# Patient Record
Sex: FEMALE | Race: WHITE | NOT HISPANIC OR LATINO | URBAN - METROPOLITAN AREA
[De-identification: names, ages, dates, MRNs, and addresses within clinical notes are randomized per-mention and may not be internally consistent; named-entity substitution may affect disease eponyms.]

---

## 2023-09-01 PROBLEM — E66.812 CLASS 2 SEVERE OBESITY WITH SERIOUS COMORBIDITY AND BODY MASS INDEX (BMI) OF 37.0 TO 37.9 IN ADULT: Status: ACTIVE | Noted: 2023-09-01

## 2023-09-01 PROBLEM — B00.1 RECURRENT COLD SORES: Status: ACTIVE | Noted: 2023-09-01

## 2023-09-01 PROBLEM — R31.9 HEMATURIA: Status: ACTIVE | Noted: 2023-09-01

## 2023-09-01 PROBLEM — L30.9 HAND DERMATITIS: Status: ACTIVE | Noted: 2023-09-01

## 2023-09-01 PROBLEM — M21.41 ACQUIRED PES PLANUS OF BOTH FEET: Status: ACTIVE | Noted: 2023-09-01

## 2023-09-01 PROBLEM — R00.2 PALPITATIONS: Status: ACTIVE | Noted: 2023-09-01

## 2023-09-01 PROBLEM — M76.829 POSTERIOR TIBIAL TENDON DYSFUNCTION: Status: ACTIVE | Noted: 2023-09-01

## 2023-09-01 PROBLEM — R32 URINARY LEAKAGE: Status: ACTIVE | Noted: 2023-09-01

## 2023-09-01 PROBLEM — M21.42 ACQUIRED PES PLANUS OF BOTH FEET: Status: ACTIVE | Noted: 2023-09-01

## 2023-09-01 PROBLEM — T83.32XA IUD THREADS LOST: Status: ACTIVE | Noted: 2023-09-01

## 2023-09-01 PROBLEM — N76.0 VAGINITIS: Status: ACTIVE | Noted: 2023-09-01

## 2023-09-01 PROBLEM — M67.472 GANGLION CYST OF LEFT FOOT: Status: ACTIVE | Noted: 2023-09-01

## 2023-09-01 PROBLEM — E55.9 VITAMIN D DEFICIENCY: Status: ACTIVE | Noted: 2023-09-01

## 2023-09-01 PROBLEM — E66.01 CLASS 2 SEVERE OBESITY WITH SERIOUS COMORBIDITY AND BODY MASS INDEX (BMI) OF 37.0 TO 37.9 IN ADULT (MULTI): Status: ACTIVE | Noted: 2023-09-01

## 2023-09-01 RX ORDER — ASPIRIN 325 MG
50000 TABLET, DELAYED RELEASE (ENTERIC COATED) ORAL
COMMUNITY
Start: 2020-12-29

## 2023-09-01 RX ORDER — VALACYCLOVIR HYDROCHLORIDE 1 G/1
2000 TABLET, FILM COATED ORAL 2 TIMES DAILY
COMMUNITY
Start: 2021-01-05 | End: 2023-11-03

## 2023-09-01 RX ORDER — TRAZODONE HYDROCHLORIDE 50 MG/1
50 TABLET ORAL NIGHTLY PRN
COMMUNITY
End: 2023-10-26

## 2023-09-01 RX ORDER — CHOLECALCIFEROL (VITAMIN D3) 50 MCG
50 TABLET ORAL DAILY
COMMUNITY
End: 2023-10-26

## 2023-10-09 ENCOUNTER — APPOINTMENT (OUTPATIENT)
Dept: OBSTETRICS AND GYNECOLOGY | Facility: CLINIC | Age: 43
End: 2023-10-09
Payer: COMMERCIAL

## 2023-10-26 ENCOUNTER — OFFICE VISIT (OUTPATIENT)
Dept: OBSTETRICS AND GYNECOLOGY | Facility: CLINIC | Age: 43
End: 2023-10-26
Payer: COMMERCIAL

## 2023-10-26 VITALS
HEIGHT: 66 IN | SYSTOLIC BLOOD PRESSURE: 130 MMHG | BODY MASS INDEX: 36.8 KG/M2 | DIASTOLIC BLOOD PRESSURE: 80 MMHG | WEIGHT: 229 LBS

## 2023-10-26 DIAGNOSIS — Z12.11 SCREEN FOR COLON CANCER: ICD-10-CM

## 2023-10-26 DIAGNOSIS — Z01.419 ENCOUNTER FOR GYNECOLOGICAL EXAMINATION WITHOUT ABNORMAL FINDING: ICD-10-CM

## 2023-10-26 DIAGNOSIS — Z12.31 VISIT FOR SCREENING MAMMOGRAM: Primary | ICD-10-CM

## 2023-10-26 PROCEDURE — 99386 PREV VISIT NEW AGE 40-64: CPT | Performed by: OBSTETRICS & GYNECOLOGY

## 2023-10-26 PROCEDURE — 87624 HPV HI-RISK TYP POOLED RSLT: CPT

## 2023-10-26 PROCEDURE — 88175 CYTOPATH C/V AUTO FLUID REDO: CPT

## 2023-10-26 PROCEDURE — 1036F TOBACCO NON-USER: CPT | Performed by: OBSTETRICS & GYNECOLOGY

## 2023-10-26 NOTE — PROGRESS NOTES
Subjective   Belinda Tom is a 43 y.o. female here for a routine exam. Current complaints: She feels well.  She has amenorrhea with her Mirena IUD that was inserted January 20, 2017.  She has no pelvic pain, no dysuria, no change in bowel habits or vaginal discharge.    We discussed occasional stress incontinence and Kegel exercises.  She is , no change in partner.  She is a teacher.. Personal health questionnaire reviewed: yes.     Gynecologic History  No LMP recorded (lmp unknown). Patient has had an implant.  Contraception: IUD  Last Pap: 3/2018. Results were: normal  Last mammogram: 1/17/22. Results were: normal    Obstetric History  OB History   No obstetric history on file.       Objective   Constitutional: Alert and in no acute distress. Well developed, well nourished.   Head and Face: Head and face: Normal.    Eyes: Normal external exam - nonicteric sclera, extraocular movements intact (EOMI) and no ptosis.   Neck: No neck asymmetry. Supple. Thyroid not enlarged and there were no palpable thyroid nodules.    Pulmonary: No respiratory distress.   Chest: Breasts: Normal appearance, no nipple discharge and no skin changes. Palpation of breasts and axillae: No palpable mass and no axillary lymphadenopathy.   Abdomen: Soft nontender; no abdominal mass palpated. No organomegaly. No hernias.   Genitourinary: External genitalia: Normal. No inguinal lymphadenopathy. Bartholin's Urethral and Skenes Glands: Normal. Urethra: Normal.  Bladder: Normal on palpation. Vagina: Normal. Cervix: Normal. A pap was sent.  The strings are visible of the Mirena IUD.  Uterus: Normal.  Right Adnexa/parametria: Normal.  Left Adnexa/parametria: Normal.  Inspection of Perianal Area: Normal.   Musculoskeletal: No joint swelling seen, normal movements of all extremities.   Skin: Normal skin color and pigmentation, normal skin turgor, and no rash.   Neurologic: Non-focal. Grossly intact.   Psychiatric: Alert and oriented x 3.  Affect normal to patient baseline. Mood: Appropriate.  Physical Exam     Assessment/Plan   Healthy female exam.  This is a 43-year-old female with a normal exam.  A Pap smear was sent, I will contact her if there are any abnormalities.  Her routine mammogram was ordered with tomosynthesis.  The strings of the Mirena IUD are visible, consistent with an IUD in the proper location.  It was inserted 2017, and will  and 2025.  I will see her in 1 year.  Mammogram ordered.

## 2023-11-03 ENCOUNTER — OFFICE VISIT (OUTPATIENT)
Dept: PRIMARY CARE | Facility: CLINIC | Age: 43
End: 2023-11-03
Payer: COMMERCIAL

## 2023-11-03 VITALS
SYSTOLIC BLOOD PRESSURE: 110 MMHG | DIASTOLIC BLOOD PRESSURE: 70 MMHG | BODY MASS INDEX: 36.48 KG/M2 | HEIGHT: 66 IN | WEIGHT: 227 LBS | HEART RATE: 68 BPM | OXYGEN SATURATION: 96 %

## 2023-11-03 DIAGNOSIS — R00.2 PALPITATIONS: ICD-10-CM

## 2023-11-03 DIAGNOSIS — Z00.00 ROUTINE GENERAL MEDICAL EXAMINATION AT A HEALTH CARE FACILITY: Primary | ICD-10-CM

## 2023-11-03 PROCEDURE — 1036F TOBACCO NON-USER: CPT | Performed by: STUDENT IN AN ORGANIZED HEALTH CARE EDUCATION/TRAINING PROGRAM

## 2023-11-03 PROCEDURE — 99396 PREV VISIT EST AGE 40-64: CPT | Performed by: STUDENT IN AN ORGANIZED HEALTH CARE EDUCATION/TRAINING PROGRAM

## 2023-11-03 RX ORDER — VALACYCLOVIR HYDROCHLORIDE 1 G/1
1000 TABLET, FILM COATED ORAL 2 TIMES DAILY
COMMUNITY

## 2023-11-03 NOTE — PROGRESS NOTES
"  Subjective     Patient ID: Belinda Tom is a 43 y.o. female who presents for Annual Exam (CPE.).      \"Feeling  off \" X 10 days - HA , nausea , palpitations     Last Physical : ___Years ago     Pt's PMH, PSH, SH, FH , meds and allergies was obtained / reviewed and updated .     Dental visits : Y  Vision issues : N  Hearing issues : N    Immunizations : Y    Diet :  could be better  Exercise:  Weight concerns :     Alcohol: as noted in   Tobacco: as noted in   Recreational drug use : None/ as noted in       ======================================================    Visit Vitals  /70   Pulse 68   Ht 1.676 m (5' 6\")   Wt 103 kg (227 lb)   LMP  (LMP Unknown) Comment: Mirena IUD   SpO2 96%   BMI 36.64 kg/m²   OB Status Implant   Smoking Status Never   BSA 2.19 m²      No LMP recorded (lmp unknown). Patient has had an implant.     =====================================    Review of systems:  Constitutional: no chills, no fever and no night sweats.     Eyes: no blurred vision and no eyesight problems.     ENT: no hearing loss, no nasal congestion, no nasal discharge, no hoarseness and no sore throat.     Cardiovascular: no chest pain, no intermittent leg claudication, no lower extremity edema, no palpitations and no syncope.     Respiratory: no cough, no shortness of breath during exertion, no shortness of breath at rest and no wheezing.     Gastrointestinal: no abdominal pain, no blood in stools, no constipation, no diarrhea, no melena, no nausea, no rectal pain and no vomiting.     Genitourinary: no dysuria, no change in urinary frequency, no urinary hesitancy, no feelings of urinary urgency and no vaginal discharge.     Musculoskeletal: no arthralgias, no back pain and no myalgias.     Integumentary: no new skin lesions and no rashes.     Neurological: no difficulty walking, no headache, no limb weakness, no numbness and no tingling.     Psychiatric: no anxiety, no depression, no anhedonia and no substance " "use disorders.   ============================================================    Physical exam :    Constitutional: Alert and in no acute distress. Well developed, well nourished.     Eyes: Normal external exam. Pupils were equal in size, round, reactive to light (PERRL) with normal accommodation and extraocular movements intact (EOMI).     Ears, Nose, Mouth, and Throat: External inspection of ears and nose: Normal.  Otoscopic examination: Normal.      Neck: No neck mass was observed. Supple.     Cardiovascular: Heart rate and rhythm were normal, normal S1 and S2, no gallops, no murmurs and no pericardial rub    Pulmonary: No respiratory distress. Clear bilateral breath sounds.     Abdomen: Soft nontender; no abdominal mass palpated. No organomegaly.     Musculoskeletal: No joint swelling seen, normal movements of all extremities. Range of motion: Normal.  Muscle strength/tone: Normal.      Neurologic: Deep tendon reflexes were 2+ and symmetric. Sensation: Normal.     Psychiatric: Judgment and insight: Intact. Mood and affect: Normal.        Assessment/Plan    Diagnoses and all orders for this visit:  Routine general medical examination at a health care facility  -     CBC; Future  -     Comprehensive Metabolic Panel; Future  -     Hemoglobin A1C; Future  -     Lipid Panel; Future  -     TSH with reflex to Free T4 if abnormal; Future  Palpitations  -     Referral to Cardiology; Future              43year-old female with obesity presents for annual physical exam     Rpt BP as noted in vitals   To RTO  in 2m  with home readings     Unclear reasons at this time abt her \" generalized unwell feeling \"   No arrhythmia noted on exam , normal BP   Will wait on the labs     Fu with cardiology for intermittent palpitations   Had zio patch placed a year ago      HM :   Tdap : Up-to-date  Flu : Up-to-date     Mammogram : Ordered by gyn to schedule    PAP : done 10/2023, wali with gyn   Cologard ordered , results pending "     General preventive care discussed which includes regular exercise, healthy eating habits, to include more of plant based diet, to include foods of all colors  , limiting excessive red meat intake , staying active to maintain a weight that is appropriate for his/ her height / making efforts to reach an ideal weight for height,  avoidance of smoking, excess alcohol consumption, avoidance of recreational drugs, safe and responsible sexual relationships and practices.     Calcium + Vit D supplements recommended   Regular exercise recommended   Healthy eating choices discussed and recommended   BMI ( Body mass index ) discussed and encouraged weight loss through the above discussed lifestyle modifications .     Conditions addressed and mgmt as noted above.  Pertinent labs, images/ imaging reports , chart review was done .   Age appropriate labs / labs for mgmt of chronic medical conditions ordered, further mgmt pending the results.

## 2023-11-07 ENCOUNTER — LAB (OUTPATIENT)
Dept: LAB | Facility: LAB | Age: 43
End: 2023-11-07
Payer: COMMERCIAL

## 2023-11-07 DIAGNOSIS — Z00.00 ROUTINE GENERAL MEDICAL EXAMINATION AT A HEALTH CARE FACILITY: ICD-10-CM

## 2023-11-07 LAB
ALBUMIN SERPL BCP-MCNC: 4.6 G/DL (ref 3.4–5)
ALP SERPL-CCNC: 68 U/L (ref 33–110)
ALT SERPL W P-5'-P-CCNC: 30 U/L (ref 7–45)
ANION GAP SERPL CALC-SCNC: 14 MMOL/L (ref 10–20)
AST SERPL W P-5'-P-CCNC: 20 U/L (ref 9–39)
BILIRUB SERPL-MCNC: 0.7 MG/DL (ref 0–1.2)
BUN SERPL-MCNC: 14 MG/DL (ref 6–23)
CALCIUM SERPL-MCNC: 9.3 MG/DL (ref 8.6–10.6)
CHLORIDE SERPL-SCNC: 104 MMOL/L (ref 98–107)
CHOLEST SERPL-MCNC: 185 MG/DL (ref 0–199)
CHOLESTEROL/HDL RATIO: 5
CO2 SERPL-SCNC: 26 MMOL/L (ref 21–32)
CREAT SERPL-MCNC: 0.68 MG/DL (ref 0.5–1.05)
ERYTHROCYTE [DISTWIDTH] IN BLOOD BY AUTOMATED COUNT: 11.9 % (ref 11.5–14.5)
EST. AVERAGE GLUCOSE BLD GHB EST-MCNC: 108 MG/DL
GFR SERPL CREATININE-BSD FRML MDRD: >90 ML/MIN/1.73M*2
GLUCOSE SERPL-MCNC: 81 MG/DL (ref 74–99)
HBA1C MFR BLD: 5.4 %
HCT VFR BLD AUTO: 43 % (ref 36–46)
HDLC SERPL-MCNC: 37.3 MG/DL
HGB BLD-MCNC: 14.3 G/DL (ref 12–16)
LDLC SERPL CALC-MCNC: 117 MG/DL
MCH RBC QN AUTO: 30.2 PG (ref 26–34)
MCHC RBC AUTO-ENTMCNC: 33.3 G/DL (ref 32–36)
MCV RBC AUTO: 91 FL (ref 80–100)
NON HDL CHOLESTEROL: 148 MG/DL (ref 0–149)
NRBC BLD-RTO: 0 /100 WBCS (ref 0–0)
PLATELET # BLD AUTO: 258 X10*3/UL (ref 150–450)
POTASSIUM SERPL-SCNC: 4.3 MMOL/L (ref 3.5–5.3)
PROT SERPL-MCNC: 7.2 G/DL (ref 6.4–8.2)
RBC # BLD AUTO: 4.74 X10*6/UL (ref 4–5.2)
SODIUM SERPL-SCNC: 140 MMOL/L (ref 136–145)
TRIGL SERPL-MCNC: 154 MG/DL (ref 0–149)
TSH SERPL-ACNC: 1.87 MIU/L (ref 0.44–3.98)
VLDL: 31 MG/DL (ref 0–40)
WBC # BLD AUTO: 6.1 X10*3/UL (ref 4.4–11.3)

## 2023-11-07 PROCEDURE — 80061 LIPID PANEL: CPT

## 2023-11-07 PROCEDURE — 83036 HEMOGLOBIN GLYCOSYLATED A1C: CPT

## 2023-11-07 PROCEDURE — 85027 COMPLETE CBC AUTOMATED: CPT

## 2023-11-07 PROCEDURE — 36415 COLL VENOUS BLD VENIPUNCTURE: CPT

## 2023-11-07 PROCEDURE — 84443 ASSAY THYROID STIM HORMONE: CPT

## 2023-11-07 PROCEDURE — 80053 COMPREHEN METABOLIC PANEL: CPT

## 2023-11-09 LAB
CYTOLOGY CMNT CVX/VAG CYTO-IMP: NORMAL
HPV HR GENOTYPES PNL CVX NAA+PROBE: NEGATIVE
HPV HR GENOTYPES PNL CVX NAA+PROBE: NEGATIVE
HPV16 DNA SPEC QL NAA+PROBE: NEGATIVE
HPV18 DNA SPEC QL NAA+PROBE: NEGATIVE
LAB AP CONTRACEPTIVE HISTORY: NORMAL
LAB AP HPV GENOTYPE QUESTION: YES
LAB AP HPV HR: NORMAL
LABORATORY COMMENT REPORT: NORMAL
NONINV COLON CA DNA+OCC BLD SCRN STL QL: NEGATIVE
PATH REPORT.TOTAL CANCER: NORMAL

## 2023-11-22 ENCOUNTER — ANCILLARY PROCEDURE (OUTPATIENT)
Dept: RADIOLOGY | Facility: CLINIC | Age: 43
End: 2023-11-22
Payer: COMMERCIAL

## 2023-11-22 DIAGNOSIS — Z12.31 VISIT FOR SCREENING MAMMOGRAM: ICD-10-CM

## 2023-11-22 PROCEDURE — 77067 SCR MAMMO BI INCL CAD: CPT

## 2023-11-22 PROCEDURE — 77067 SCR MAMMO BI INCL CAD: CPT | Performed by: RADIOLOGY

## 2023-11-22 PROCEDURE — 77063 BREAST TOMOSYNTHESIS BI: CPT | Performed by: RADIOLOGY

## 2023-12-22 ENCOUNTER — OFFICE VISIT (OUTPATIENT)
Dept: PRIMARY CARE | Facility: CLINIC | Age: 43
End: 2023-12-22
Payer: COMMERCIAL

## 2023-12-22 VITALS
SYSTOLIC BLOOD PRESSURE: 130 MMHG | OXYGEN SATURATION: 96 % | HEIGHT: 66 IN | DIASTOLIC BLOOD PRESSURE: 87 MMHG | HEART RATE: 73 BPM | WEIGHT: 230.6 LBS | BODY MASS INDEX: 37.06 KG/M2

## 2023-12-22 DIAGNOSIS — R51.9 NONINTRACTABLE EPISODIC HEADACHE, UNSPECIFIED HEADACHE TYPE: Primary | ICD-10-CM

## 2023-12-22 PROCEDURE — 1036F TOBACCO NON-USER: CPT | Performed by: STUDENT IN AN ORGANIZED HEALTH CARE EDUCATION/TRAINING PROGRAM

## 2023-12-22 PROCEDURE — 99214 OFFICE O/P EST MOD 30 MIN: CPT | Performed by: STUDENT IN AN ORGANIZED HEALTH CARE EDUCATION/TRAINING PROGRAM

## 2023-12-22 RX ORDER — NAPROXEN 500 MG/1
500 TABLET ORAL 2 TIMES DAILY PRN
Qty: 60 TABLET | Refills: 3 | Status: SHIPPED | OUTPATIENT
Start: 2023-12-22 | End: 2024-03-21

## 2023-12-22 RX ORDER — RIZATRIPTAN BENZOATE 5 MG/1
5 TABLET ORAL ONCE AS NEEDED
Qty: 9 TABLET | Refills: 3 | Status: SHIPPED | OUTPATIENT
Start: 2023-12-22 | End: 2024-01-21

## 2023-12-22 NOTE — PROGRESS NOTES
"Subjective   Patient ID: Belinda Tom is a 43 y.o. female who presents for Follow-up (Follow-up from last visit. ).        HPI    HA 3-4 times a year, not always associated with nausea   Frontal and temporal   Migraine HA- mother and daughter       Visit Vitals  /87   Pulse 73   Ht 1.676 m (5' 6\")   Wt 105 kg (230 lb 9.6 oz)   SpO2 96%   BMI 37.22 kg/m²   OB Status Implant   Smoking Status Never   BSA 2.21 m²      No LMP recorded. Patient has had an implant.     Review of Systems    Constitutional : No feeling poorly / fevers/ chills / night sweats/ fatigue   Cardiovascular : No CP /Palpitations/ lower extremity edema / syncope   Respiratory : No Cough /CONTRERAS/Dyspnea at rest     CNS: No confusion / HA/ tingling/ numbness/ weakness of extremities  Psychiatric: No anxiety/ depression/ SI/HI    All other systems have been reviewed and are negative for complaint       Physical Exam    Constitutional : Vitals reviewed. Alert and in no distress  Cardiovascular : RRR, Normal S1, S2, No pericardial rub/ gallop, no peripheral edema   Pulmonary: No respiratory distress, CTAB   MSK : Normal gait and station , strength and tone     Neurologic : CNs 2-12 grossly intact , no obvious FNDs  Psych : A,Ox3, normal mood and affect      Assessment/Plan   Diagnoses and all orders for this visit:  Nonintractable episodic headache, unspecified headache type  -     rizatriptan (Maxalt) 5 mg tablet; Take 1 tablet (5 mg) by mouth 1 time if needed for migraine. May repeat in 2 hours if unresolved. Do not exceed 30 mg in 24 hours.  -     naproxen (Naprosyn) 500 mg tablet; Take 1 tablet (500 mg) by mouth 2 times a day as needed for headaches (pain).        HA . Though does not have many of the typical features of migraine HA , given the family history , will rx with triptans     Conditions addressed and mgmt as noted above.  Pertinent labs, images/ imaging reports , chart review was done .   Age appropriate labs / labs for mgmt of chronic " medical conditions ordered, further mgmt pending the results.

## 2024-01-16 ENCOUNTER — APPOINTMENT (OUTPATIENT)
Dept: PRIMARY CARE | Facility: CLINIC | Age: 44
End: 2024-01-16
Payer: COMMERCIAL

## 2024-09-09 DIAGNOSIS — B00.1 RECURRENT COLD SORES: Primary | ICD-10-CM

## 2024-09-12 RX ORDER — VALACYCLOVIR HYDROCHLORIDE 1 G/1
1000 TABLET, FILM COATED ORAL 2 TIMES DAILY
Qty: 60 TABLET | Refills: 0 | Status: SHIPPED | OUTPATIENT
Start: 2024-09-12

## 2024-11-01 ENCOUNTER — APPOINTMENT (OUTPATIENT)
Dept: OBSTETRICS AND GYNECOLOGY | Facility: CLINIC | Age: 44
End: 2024-11-01
Payer: COMMERCIAL

## 2024-11-01 VITALS
HEIGHT: 66 IN | BODY MASS INDEX: 36.32 KG/M2 | HEART RATE: 77 BPM | SYSTOLIC BLOOD PRESSURE: 118 MMHG | DIASTOLIC BLOOD PRESSURE: 81 MMHG | WEIGHT: 226 LBS

## 2024-11-01 DIAGNOSIS — Z01.419 ENCOUNTER FOR GYNECOLOGICAL EXAMINATION WITHOUT ABNORMAL FINDING: Primary | ICD-10-CM

## 2024-11-01 PROCEDURE — 3008F BODY MASS INDEX DOCD: CPT | Performed by: OBSTETRICS & GYNECOLOGY

## 2024-11-01 PROCEDURE — 99396 PREV VISIT EST AGE 40-64: CPT | Performed by: OBSTETRICS & GYNECOLOGY

## 2024-11-01 PROCEDURE — 1036F TOBACCO NON-USER: CPT | Performed by: OBSTETRICS & GYNECOLOGY

## 2024-11-01 RX ORDER — LEVONORGESTREL 52 MG/1
1 INTRAUTERINE DEVICE INTRAUTERINE ONCE
COMMUNITY

## 2024-11-27 ENCOUNTER — HOSPITAL ENCOUNTER (OUTPATIENT)
Dept: RADIOLOGY | Facility: CLINIC | Age: 44
Discharge: HOME | End: 2024-11-27
Payer: COMMERCIAL

## 2024-11-27 VITALS — HEIGHT: 66 IN | BODY MASS INDEX: 35.36 KG/M2 | WEIGHT: 220 LBS

## 2024-11-27 DIAGNOSIS — Z12.31 ENCOUNTER FOR SCREENING MAMMOGRAM FOR MALIGNANT NEOPLASM OF BREAST: ICD-10-CM

## 2024-11-27 DIAGNOSIS — Z01.419 ENCOUNTER FOR GYNECOLOGICAL EXAMINATION WITHOUT ABNORMAL FINDING: ICD-10-CM

## 2024-11-27 PROCEDURE — 77067 SCR MAMMO BI INCL CAD: CPT

## 2025-01-27 ENCOUNTER — APPOINTMENT (OUTPATIENT)
Dept: PRIMARY CARE | Facility: CLINIC | Age: 45
End: 2025-01-27
Payer: COMMERCIAL

## 2025-01-27 VITALS
WEIGHT: 237.6 LBS | BODY MASS INDEX: 38.18 KG/M2 | HEIGHT: 66 IN | DIASTOLIC BLOOD PRESSURE: 80 MMHG | OXYGEN SATURATION: 97 % | SYSTOLIC BLOOD PRESSURE: 120 MMHG | HEART RATE: 88 BPM

## 2025-01-27 DIAGNOSIS — M77.11 LATERAL EPICONDYLITIS OF RIGHT ELBOW: ICD-10-CM

## 2025-01-27 DIAGNOSIS — Z00.00 ROUTINE GENERAL MEDICAL EXAMINATION AT A HEALTH CARE FACILITY: Primary | ICD-10-CM

## 2025-01-27 DIAGNOSIS — M79.672 FOOT PAIN, BILATERAL: ICD-10-CM

## 2025-01-27 DIAGNOSIS — M79.671 FOOT PAIN, BILATERAL: ICD-10-CM

## 2025-01-27 PROCEDURE — 99214 OFFICE O/P EST MOD 30 MIN: CPT | Performed by: STUDENT IN AN ORGANIZED HEALTH CARE EDUCATION/TRAINING PROGRAM

## 2025-01-27 PROCEDURE — 1036F TOBACCO NON-USER: CPT | Performed by: STUDENT IN AN ORGANIZED HEALTH CARE EDUCATION/TRAINING PROGRAM

## 2025-01-27 PROCEDURE — 3008F BODY MASS INDEX DOCD: CPT | Performed by: STUDENT IN AN ORGANIZED HEALTH CARE EDUCATION/TRAINING PROGRAM

## 2025-01-27 PROCEDURE — 99396 PREV VISIT EST AGE 40-64: CPT | Performed by: STUDENT IN AN ORGANIZED HEALTH CARE EDUCATION/TRAINING PROGRAM

## 2025-01-27 RX ORDER — ACETAMINOPHEN 500 MG
2000 TABLET ORAL DAILY
COMMUNITY

## 2025-01-27 NOTE — PROGRESS NOTES
"  Subjective     Patient ID: Belinda Tom is a 44 y.o. female who presents for Annual Exam (APE. ).      Last Physical : ___Years ago     Pt's PMH, PSH, SH, FH , meds and allergies was obtained / reviewed and updated .     Dental visits : Y  Vision issues ::   Hearing issues : N    Immunizations : Y    Diet :     Exercise:  Weight concerns :     Alcohol: as noted in   Tobacco: as noted in   Recreational drug use : None/ as noted in     ======================================================    Visit Vitals  /88   Pulse 88   Ht 1.676 m (5' 6\")   Wt 108 kg (237 lb 9.6 oz)   SpO2 97%   BMI 38.35 kg/m²   OB Status Implant   Smoking Status Never   BSA 2.24 m²      No LMP recorded. Patient has had an implant.   Current Outpatient Medications   Medication Instructions    cetirizine HCl (ZYRTEC ORAL) Take by mouth.    cholecalciferol (VITAMIN D-3) 50,000 Units, Once Weekly    levonorgestrel (Mirena) 20 mcg/24hr IUD 1 each, Once    rizatriptan (MAXALT) 5 mg, oral, Once as needed, May repeat in 2 hours if unresolved. Do not exceed 30 mg in 24 hours.    valACYclovir (VALTREX) 1,000 mg, oral, 2 times daily      Social History     Tobacco Use    Smoking status: Never    Smokeless tobacco: Never   Substance Use Topics    Alcohol use: Never        =====================================    Review of systems:  Constitutional: no chills, no fever and no night sweats.     Eyes: no blurred vision and no eyesight problems.     ENT: no hearing loss, no nasal congestion, no nasal discharge, no hoarseness and no sore throat.     Cardiovascular: no chest pain, no intermittent leg claudication, no lower extremity edema, no palpitations and no syncope.     Respiratory: no cough, no shortness of breath during exertion, no shortness of breath at rest and no wheezing.     Gastrointestinal: no abdominal pain, no blood in stools, no constipation, no diarrhea, no melena, no nausea, no rectal pain and no vomiting.     Genitourinary: no " "dysuria, no change in urinary frequency, no urinary hesitancy, no feelings of urinary urgency and no vaginal discharge.     Musculoskeletal: no arthralgias, no back pain and no myalgias.     Integumentary: no new skin lesions and no rashes.     Neurological: no difficulty walking, no headache, no limb weakness, no numbness and no tingling.     Psychiatric: no anxiety, no depression, no anhedonia and no substance use disorders.   ============================================================    Physical exam :    Constitutional: Alert and in no acute distress. Well developed, well nourished.     Eyes: Normal external exam. Pupils were equal in size, round, reactive to light (PERRL) with normal accommodation and extraocular movements intact (EOMI).     Ears, Nose, Mouth, and Throat: External inspection of ears and nose: Normal.  Otoscopic examination: Normal.      Neck: No neck mass was observed. Supple.     Cardiovascular: Heart rate and rhythm were normal, normal S1 and S2, no gallops, no murmurs and no pericardial rub    Pulmonary: No respiratory distress. Clear bilateral breath sounds.     Abdomen: Soft nontender; no abdominal mass palpated. No organomegaly.     Musculoskeletal: No joint swelling seen, normal movements of all extremities. Range of motion: Normal.  Muscle strength/tone: Normal.      Neurologic: Deep tendon reflexes were 2+ and symmetric. Sensation: Normal.     Psychiatric: Judgment and insight: Intact. Mood and affect: Normal.        Assessment/Plan    Diagnoses and all orders for this visit:  Routine general medical examination at a health care facility  -     CBC; Future  -     Comprehensive Metabolic Panel; Future  -     Hemoglobin A1C; Future  -     Lipid Panel; Future  -     TSH with reflex to Free T4 if abnormal; Future        43year-old female with obesity presents for annual physical exam      \" Dislocated ribs\" chiropractor realigns them  Ortho and podiatry referrals as above   Rpt BP at " goal         HM :   Tdap : Up-to-date  Flu : Up-to-date  Shingles : at age 48      Mammogram :  utd   PAP : est with gyn   Cologard negative negative in 2026     General preventive care discussed which includes regular exercise, healthy eating habits, to include more of plant based diet, to include foods of all colors  , limiting excessive red meat intake , staying active to maintain a weight that is appropriate for his/ her height / making efforts to reach an ideal weight for height,  avoidance of smoking, excess alcohol consumption, avoidance of recreational drugs, safe and responsible sexual relationships and practices.     It is recommended to get 150 mins of  moderate intensity  ( you should be able to talk and not sing ) exercise in a week .     Calcium + Vit D supplements recommended   Regular exercise recommended   Healthy eating choices discussed and recommended   BMI ( Body mass index ) discussed and encouraged weight loss through the above discussed lifestyle modifications .     Conditions addressed and mgmt as noted above.  Pertinent labs, images/ imaging reports , chart review was done .   Age appropriate labs / labs for mgmt of chronic medical conditions ordered, further mgmt pending the results.       This note is intended for the physician writing it, as well as to communicate findings to other healthcare professionals. These notes use medical lexicon that may be misunderstood by non medical persons. Therefore, interpretations of medical notes and terminology should be approached with caution.

## 2025-01-28 DIAGNOSIS — L25.9 CONTACT DERMATITIS, UNSPECIFIED CONTACT DERMATITIS TYPE, UNSPECIFIED TRIGGER: Primary | ICD-10-CM

## 2025-01-28 RX ORDER — CLOBETASOL PROPIONATE 0.5 MG/G
CREAM TOPICAL
Qty: 15 G | Refills: 2 | Status: SHIPPED | OUTPATIENT
Start: 2025-01-28

## 2025-01-28 RX ORDER — CLOBETASOL PROPIONATE 0.5 MG/G
CREAM TOPICAL 2 TIMES DAILY
COMMUNITY
End: 2025-01-28 | Stop reason: SDUPTHER

## 2025-01-30 ENCOUNTER — APPOINTMENT (OUTPATIENT)
Facility: CLINIC | Age: 45
End: 2025-01-30
Payer: COMMERCIAL

## 2025-01-30 VITALS
SYSTOLIC BLOOD PRESSURE: 141 MMHG | BODY MASS INDEX: 38.25 KG/M2 | WEIGHT: 237 LBS | DIASTOLIC BLOOD PRESSURE: 86 MMHG | HEART RATE: 97 BPM

## 2025-01-30 DIAGNOSIS — Z30.433 ENCOUNTER FOR REMOVAL AND REINSERTION OF IUD: Primary | ICD-10-CM

## 2025-01-30 PROCEDURE — 58300 INSERT INTRAUTERINE DEVICE: CPT | Performed by: OBSTETRICS & GYNECOLOGY

## 2025-01-30 PROCEDURE — 58301 REMOVE INTRAUTERINE DEVICE: CPT | Performed by: OBSTETRICS & GYNECOLOGY

## 2025-01-30 NOTE — PROGRESS NOTES
Patient ID: Belinda Tom is a 44 y.o. female here for removal of  Mirena IUD with reinsertion.  The new Mirena will  in 2033.    IUD Removal    Performed by: Yeni Obrien MD  Authorized by: Yeni Obrien MD    Procedure: IUD removal and insertion    Consent obtained by patient, parent, or legal power of  - including discussion of procedure risks and benefits, patient questions answered, and patient education provided: yes    Reason for removal: patient request    Strings visualized: yes    Cervix cleaned with: iodopovidone    Tenaculum applied to cervix: no    IUD grasped by forceps: yes    IUD removed: yes    Date/Time of Removal:  2025 3:45 PM  Removed without complications: yes    IUD intact: yes    Date/Time of Insertion:  2025 3:46 PM  Immediately prior to procedure a time out was called: yes    Pelvic exam performed: yes    Speculum placed in vagina: yes    Cervix cleaned and prepped: yes    Tenaculum/Allis/Ring Forceps applied to cervix: yes    Anesthesia used: yes    Local anesthesia:  Topical  Local anesthetic: HurriCaine spray was used.  Cervix manually dilated: no    OSM: levonorgestrel 20 mcg/24hr  Patient tolerated procedure well: yes    Estimated blood loss (mL):  0  Intended removal date: 8 years    Insertion comments: This is a 44-year-old female with an  Mirena IUD, it was inserted 2017.  She desires removal of  IUD and reinsertion with a new Mirena.  The new Mirena will  in 2033.  The removal and insertion was uncomplicated.     PAST SURGICAL HISTORY:  H/O knee surgery BILATERAL SCOPE    Status post left rotator cuff repair

## 2025-02-04 LAB
ALBUMIN SERPL-MCNC: 4.6 G/DL (ref 3.6–5.1)
ALP SERPL-CCNC: 75 U/L (ref 31–125)
ALT SERPL-CCNC: 32 U/L (ref 6–29)
ANION GAP SERPL CALCULATED.4IONS-SCNC: 9 MMOL/L (CALC) (ref 7–17)
AST SERPL-CCNC: 22 U/L (ref 10–35)
BILIRUB SERPL-MCNC: 0.7 MG/DL (ref 0.2–1.2)
BUN SERPL-MCNC: 12 MG/DL (ref 7–25)
CALCIUM SERPL-MCNC: 9.2 MG/DL (ref 8.6–10.2)
CHLORIDE SERPL-SCNC: 104 MMOL/L (ref 98–110)
CHOLEST SERPL-MCNC: 207 MG/DL
CHOLEST/HDLC SERPL: 4.5 (CALC)
CO2 SERPL-SCNC: 25 MMOL/L (ref 20–32)
CREAT SERPL-MCNC: 0.6 MG/DL (ref 0.5–0.99)
EGFRCR SERPLBLD CKD-EPI 2021: 112 ML/MIN/1.73M2
ERYTHROCYTE [DISTWIDTH] IN BLOOD BY AUTOMATED COUNT: 12.1 % (ref 11–15)
EST. AVERAGE GLUCOSE BLD GHB EST-MCNC: 120 MG/DL
EST. AVERAGE GLUCOSE BLD GHB EST-SCNC: 6.6 MMOL/L
GLUCOSE SERPL-MCNC: 92 MG/DL (ref 65–99)
HBA1C MFR BLD: 5.8 % OF TOTAL HGB
HCT VFR BLD AUTO: 42.8 % (ref 35–45)
HDLC SERPL-MCNC: 46 MG/DL
HGB BLD-MCNC: 14.3 G/DL (ref 11.7–15.5)
LDLC SERPL CALC-MCNC: 134 MG/DL (CALC)
MCH RBC QN AUTO: 29.9 PG (ref 27–33)
MCHC RBC AUTO-ENTMCNC: 33.4 G/DL (ref 32–36)
MCV RBC AUTO: 89.5 FL (ref 80–100)
NONHDLC SERPL-MCNC: 161 MG/DL (CALC)
PLATELET # BLD AUTO: 260 THOUSAND/UL (ref 140–400)
PMV BLD REES-ECKER: 10.3 FL (ref 7.5–12.5)
POTASSIUM SERPL-SCNC: 4.4 MMOL/L (ref 3.5–5.3)
PROT SERPL-MCNC: 7.3 G/DL (ref 6.1–8.1)
RBC # BLD AUTO: 4.78 MILLION/UL (ref 3.8–5.1)
SODIUM SERPL-SCNC: 138 MMOL/L (ref 135–146)
TRIGL SERPL-MCNC: 147 MG/DL
TSH SERPL-ACNC: 1.95 MIU/L
WBC # BLD AUTO: 6.9 THOUSAND/UL (ref 3.8–10.8)

## 2025-02-05 ENCOUNTER — OFFICE VISIT (OUTPATIENT)
Dept: ORTHOPEDIC SURGERY | Facility: HOSPITAL | Age: 45
End: 2025-02-05
Payer: COMMERCIAL

## 2025-02-05 ENCOUNTER — HOSPITAL ENCOUNTER (OUTPATIENT)
Dept: RADIOLOGY | Facility: HOSPITAL | Age: 45
Discharge: HOME | End: 2025-02-05
Payer: COMMERCIAL

## 2025-02-05 VITALS — HEIGHT: 66 IN | BODY MASS INDEX: 38.09 KG/M2 | WEIGHT: 237 LBS

## 2025-02-05 DIAGNOSIS — M77.11 LATERAL EPICONDYLITIS OF RIGHT ELBOW: ICD-10-CM

## 2025-02-05 DIAGNOSIS — M25.521 ELBOW PAIN, RIGHT: ICD-10-CM

## 2025-02-05 PROCEDURE — 3008F BODY MASS INDEX DOCD: CPT | Performed by: STUDENT IN AN ORGANIZED HEALTH CARE EDUCATION/TRAINING PROGRAM

## 2025-02-05 PROCEDURE — 99204 OFFICE O/P NEW MOD 45 MIN: CPT | Performed by: STUDENT IN AN ORGANIZED HEALTH CARE EDUCATION/TRAINING PROGRAM

## 2025-02-05 PROCEDURE — 99214 OFFICE O/P EST MOD 30 MIN: CPT | Performed by: STUDENT IN AN ORGANIZED HEALTH CARE EDUCATION/TRAINING PROGRAM

## 2025-02-05 PROCEDURE — 73080 X-RAY EXAM OF ELBOW: CPT | Mod: RT

## 2025-02-05 ASSESSMENT — PAIN DESCRIPTION - DESCRIPTORS: DESCRIPTORS: ACHING;SORE

## 2025-02-05 ASSESSMENT — PAIN SCALES - GENERAL: PAINLEVEL_OUTOF10: 5 - MODERATE PAIN

## 2025-02-05 ASSESSMENT — PAIN - FUNCTIONAL ASSESSMENT: PAIN_FUNCTIONAL_ASSESSMENT: 0-10

## 2025-02-05 NOTE — PROGRESS NOTES
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  Denies prior surgeries.  She is presents as new patient     She is presenting as a new patient examination for right lateral elbow pain.  This been present since the summer when she was played a lot of tennis and pickleball.  She has nagging focal lateral right elbow pain especially with resisted wrist extension and flexion.  She has stopped playing racquet sports and however pain has persisted.  She does have a counterforce offloader brace which she is worn and feels like it is slightly helping.        PHYSICAL EXAM    Extremities / Musculoskeletal:                  Right upper extremity:  No gross deformity no active skin lesions open wounds erythema edema or ecchymoses.  Full elbow range of motion flexion extension full active forearm range of motion pronation supination.  Full active wrist range of motion in flexion extension.  Elbow stable to coronal stressing at 0 and 30 degrees.  Focally tender palpation just distal to the lateral epicondyle.  Pain with resisted wrist extension.  Palpable intact biceps distal tendon.  Palpable intact triceps insertion.  Nontender patient with medial aspect of the elbow.Motor intact to radian/PIN/median/AIN/Ulnar nerve distributions. Sensation intact to light touch in the median/ulnar/radial nerve distributions. 2+ radial pulse at the wrist, hand and all digits are warm and well-perfused with a brisk capillary refill of <2s.       IMAGING / LABS / EMGs:    Right elbow x-ray series obtained today and independently reviewed demonstrating well-maintained radiocapitellar and ulnohumeral joint spaces all joints visualized are well reduced no signs of acute fractures or dislocations.  Enthesophyte noted off of the  lateral epicondyle    Past Medical History:   Diagnosis Date    Encounter for gynecological examination (general) (routine) without abnormal findings 2015    Well woman exam    Other conditions influencing health status     History of vaginal delivery    Personal history of other diseases of the respiratory system     Personal history of asthma    Personal history of other infectious and parasitic diseases     History of varicella    Personal history of other specified conditions 2021    History of palpitations    Personal history of other specified conditions 2022    History of insomnia       Medication Documentation Review Audit       Reviewed by Nathaniel Swartz LPN (Licensed Nurse) on 25 at 1651      Medication Order Taking? Sig Documenting Provider Last Dose Status   cetirizine HCl (ZYRTEC ORAL) [id number]  Take by mouth. Historical Provider, MD  Active   cholecalciferol (Vitamin D-3) 1,250 mcg (50,000 unit) capsule [id number]  Take 1 capsule (50,000 Units) by mouth 1 (one) time per week. ONCE WEEKLY FOR 12 WEEKS Historical Provider, MD  Active   cholecalciferol (Vitamin D3) 50 mcg (2,000 unit) capsule [id number]  Take 1 capsule (50 mcg) by mouth once daily. Historical Provider, MD  Active   clobetasol (Temovate) 0.05 % cream [id number]  Apply topically to the effected areas , 2 times daily as needed Jeni Flynn MD  Active   levonorgestrel (Mirena) 20 mcg/24hr IUD [id number]  52 mg by intrauterine route 1 time. Historical Provider, MD  Active   rizatriptan (Maxalt) 5 mg tablet [id number]  Take 1 tablet (5 mg) by mouth 1 time if needed for migraine. May repeat in 2 hours if unresolved. Do not exceed 30 mg in 24 hours. Jeni Flynn MD   24 [id number]   valACYclovir (Valtrex) 1 gram tablet [id number]  Take 1 tablet (1,000 mg) by mouth 2 times a day. Jeni Flynn MD  Active                    No Known Allergies    Past Surgical History:   Procedure Laterality  Date    FOOT SURGERY  03/16/2013    Foot Surgery         ASSESSMENT:   Right lateral epicondylitis    We discussed my assessment but unfortunately this very rarely require surgical treatment.  Unfortunately the symptoms [place] for a long time.  At this point I would recommend activity modification and cock up wrist bracing at the degree of wrist extension.    She also inquired about possible Orthovisc injections to her knees.  For that reason I have referred her to my sports medicine colleagues.    PLAN:   Over-the-counter NSAID as needed  Avoid heavy lifting or racquet sports  Wrist brace at all times except for showering and hygiene for the next month    Follow-up in: Although we do not need a scheduled follow-up visit at this time, I encouraged the patient to return to clinic if they have any concerns or issues whatsoever. The patient was provided with my office's contact information.     XR at next visit: none    The diagnosis and treatment plan were reviewed with the patient. All questions were answered. The patient verbalized understanding of the treatment plan. There were no barriers to understanding identified.     Note dictated with [employer] software.  Completed without full type editing and sent to avoid delay.    oTr Guthrie M.D.    Department of Orthopaedics  Hand/Upper Extremity  Phone: [phone]  Appt. Phone: [phone]\

## 2025-02-21 ENCOUNTER — APPOINTMENT (OUTPATIENT)
Dept: ORTHOPEDIC SURGERY | Facility: HOSPITAL | Age: 45
End: 2025-02-21
Payer: COMMERCIAL

## 2025-02-21 ENCOUNTER — HOSPITAL ENCOUNTER (OUTPATIENT)
Dept: RADIOLOGY | Facility: HOSPITAL | Age: 45
Discharge: HOME | End: 2025-02-21
Payer: COMMERCIAL

## 2025-02-21 ENCOUNTER — OFFICE VISIT (OUTPATIENT)
Dept: ORTHOPEDIC SURGERY | Facility: HOSPITAL | Age: 45
End: 2025-02-21
Payer: COMMERCIAL

## 2025-02-21 DIAGNOSIS — M25.562 PAIN IN BOTH KNEES, UNSPECIFIED CHRONICITY: ICD-10-CM

## 2025-02-21 DIAGNOSIS — M17.0 PRIMARY OSTEOARTHRITIS OF BOTH KNEES: ICD-10-CM

## 2025-02-21 DIAGNOSIS — M23.91 INTERNAL DERANGEMENT OF RIGHT KNEE: ICD-10-CM

## 2025-02-21 DIAGNOSIS — M25.561 PAIN IN BOTH KNEES, UNSPECIFIED CHRONICITY: ICD-10-CM

## 2025-02-21 PROCEDURE — 99214 OFFICE O/P EST MOD 30 MIN: CPT | Performed by: STUDENT IN AN ORGANIZED HEALTH CARE EDUCATION/TRAINING PROGRAM

## 2025-02-21 PROCEDURE — 73564 X-RAY EXAM KNEE 4 OR MORE: CPT | Mod: 50

## 2025-02-21 PROCEDURE — L1812 KO ELASTIC W/JOINTS PRE OTS: HCPCS | Performed by: STUDENT IN AN ORGANIZED HEALTH CARE EDUCATION/TRAINING PROGRAM

## 2025-03-14 ENCOUNTER — APPOINTMENT (OUTPATIENT)
Facility: CLINIC | Age: 45
End: 2025-03-14
Payer: COMMERCIAL

## 2025-03-14 VITALS — DIASTOLIC BLOOD PRESSURE: 81 MMHG | SYSTOLIC BLOOD PRESSURE: 113 MMHG | BODY MASS INDEX: 38.09 KG/M2 | WEIGHT: 236 LBS

## 2025-03-14 DIAGNOSIS — Z30.431 ENCOUNTER FOR ROUTINE CHECKING OF INTRAUTERINE CONTRACEPTIVE DEVICE (IUD): Primary | ICD-10-CM

## 2025-03-14 PROCEDURE — 1036F TOBACCO NON-USER: CPT | Performed by: OBSTETRICS & GYNECOLOGY

## 2025-03-14 PROCEDURE — 99214 OFFICE O/P EST MOD 30 MIN: CPT | Performed by: OBSTETRICS & GYNECOLOGY

## 2025-03-14 NOTE — PROGRESS NOTES
Belinda Tom is a 44 y.o. female who presents with a chief complaint of Follow-up (Iud check)  She is here for follow-up after Mirena IUD was reinserted 2025.    She has no further spotting, it lasted several days.    There is no discharge, no dysuria or change in bowel habits.  No pelvic pain.    Her blood pressure is normal at 113/81.    Past Medical History:   Diagnosis Date    Encounter for gynecological examination (general) (routine) without abnormal findings 2015    Well woman exam    Other conditions influencing health status     History of vaginal delivery    Personal history of other diseases of the respiratory system     Personal history of asthma    Personal history of other infectious and parasitic diseases     History of varicella    Personal history of other specified conditions 2021    History of palpitations    Personal history of other specified conditions 2022    History of insomnia     Past Surgical History:   Procedure Laterality Date    FOOT SURGERY  2013    Foot Surgery     Social History     Socioeconomic History    Marital status:    Occupational History    Occupation: TEACHER   Tobacco Use    Smoking status: Never    Smokeless tobacco: Never   Substance and Sexual Activity    Alcohol use: Never    Drug use: Never    Sexual activity: Yes     Family History   Problem Relation Name Age of Onset    Diabetes Father         OB History    Para Term  AB Living   3 3 3         SAB IAB Ectopic Multiple Live Births                  # Outcome Date GA Lbr Juan/2nd Weight Sex Type Anes PTL Lv   3 Term            2 Term            1 Term                OBJECTIVE  No Known Allergies   (Not in a hospital admission)       Review of Systems  Negative except IUD present  Physical Exam  General Appearance: awake, alert, oriented, in no acute distress and well developed, well nourished.  On exam, the external genitalia are normal, no lesions.  On speculum  exam the IUD the strings are visible, about 1.5 cm in length.  There are no lesions or discharge.    Nontender, no masses.    Limited transabdominal ultrasound was performed.  It was limited by habitus and bowels.  There is a hyperechoic area within the endometrium that appears at the fundus, consistent with an IUD in the proper location.      /81   Wt 107 kg (236 lb)   BMI 38.09 kg/m²    Problem List Items Addressed This Visit    None  This is a 44-year-old female with a Mirena IUD that was reinserted 2025.  It will  in 2033.  By exam and ultrasound, the IUD appears to be in the proper location.    I will see her for her routine exam.

## 2025-03-15 PROBLEM — N76.0 VAGINITIS: Status: RESOLVED | Noted: 2023-09-01 | Resolved: 2025-03-15

## 2025-03-15 PROBLEM — T83.32XA IUD THREADS LOST: Status: RESOLVED | Noted: 2023-09-01 | Resolved: 2025-03-15

## 2025-03-25 ENCOUNTER — OFFICE VISIT (OUTPATIENT)
Dept: PODIATRY | Facility: CLINIC | Age: 45
End: 2025-03-25
Payer: COMMERCIAL

## 2025-03-25 DIAGNOSIS — M72.2 PLANTAR FASCIITIS: Primary | ICD-10-CM

## 2025-03-25 DIAGNOSIS — M21.6X2 ACQUIRED EQUINUS DEFORMITY OF BOTH FEET: ICD-10-CM

## 2025-03-25 DIAGNOSIS — R26.89 ANTALGIC GAIT: ICD-10-CM

## 2025-03-25 DIAGNOSIS — M79.672 FOOT PAIN, BILATERAL: ICD-10-CM

## 2025-03-25 DIAGNOSIS — M21.6X1 ACQUIRED EQUINUS DEFORMITY OF BOTH FEET: ICD-10-CM

## 2025-03-25 DIAGNOSIS — M79.671 FOOT PAIN, BILATERAL: ICD-10-CM

## 2025-03-25 PROCEDURE — 99204 OFFICE O/P NEW MOD 45 MIN: CPT | Performed by: PODIATRIST

## 2025-03-25 PROCEDURE — 1036F TOBACCO NON-USER: CPT | Performed by: PODIATRIST

## 2025-03-25 PROCEDURE — 99214 OFFICE O/P EST MOD 30 MIN: CPT | Performed by: PODIATRIST

## 2025-03-25 ASSESSMENT — ENCOUNTER SYMPTOMS
GASTROINTESTINAL NEGATIVE: 1
CONSTITUTIONAL NEGATIVE: 1
PSYCHIATRIC NEGATIVE: 1
RESPIRATORY NEGATIVE: 1
ALLERGIC/IMMUNOLOGIC NEGATIVE: 1
HEMATOLOGIC/LYMPHATIC NEGATIVE: 1
EYES NEGATIVE: 1
ENDOCRINE NEGATIVE: 1
CARDIOVASCULAR NEGATIVE: 1

## 2025-03-25 NOTE — PROGRESS NOTES
Chief Complaint   Patient presents with    Foot Problem     NEEDS NEW INSERTS     Patient has been wearing orthotics for several years.  Needs new orthotics.  She has high arched feet.  States that her arches have decreased in size over the years.  Without wearing orthotics plantar feet are quite painful.  Additionally lower back pain and knee pain.  There are no other complaints at this time.  Teacher.  Non-smoker.  No diabetes.    Review of Systems   Constitutional: Negative.    HENT: Negative.     Eyes: Negative.    Respiratory: Negative.     Cardiovascular: Negative.    Gastrointestinal: Negative.    Endocrine: Negative.    Genitourinary: Negative.    Musculoskeletal:  Positive for gait problem.   Allergic/Immunologic: Negative.    Hematological: Negative.    Psychiatric/Behavioral: Negative.           General/Constitutional: Alert. NAD.   Respiratory: Non labored breathing.   Psychiatric: Mood and affect normal/baseline.   HEENT: Sclera clear. Wearing corrective lenses.  Dermatologic: Skin and nails intact.  No acute inflammatory infectious process. Web spaces are dry. No ulcers no pre-ulcerative areas.  Vascular: Pedal pulses are intact and symmetric including the dorsalis pedis and the posterior tibial pulses. Feet are warm to touch. No swelling appreciated either extremity.  Neurological: Alert and oriented. No acute distress. No sensory impairment bilateral.  Musculoskeletal: Strength is normal for age. No acute deficits appreciated.  Bilateral cavus deformity with the feet.  Rigid foot type.  Equinus deformity.  No Achilles insertional pain.  No plantar fascial pain at this time.  No gross deformities appreciated.    Impression: Equinus deformity, cavus foot position, bilateral foot pain, plantar fasciitis.    -Today's treatment and course of therapy was discussed with the patient in detail. Patient's questions were answered. Proper foot care was discussed. This dictation was done using Dragon computer  software and as such may contain grammatical errors.  Patient understanding and agreeable current course of therapy.    -Order for custom orthotics.    -She can follow-up as needed should there be any problems.    -Recent labs reviewed.  Elevated lipids.  2/3/2025.

## 2025-03-31 ENCOUNTER — APPOINTMENT (OUTPATIENT)
Dept: PHYSICAL THERAPY | Facility: CLINIC | Age: 45
End: 2025-03-31
Payer: COMMERCIAL

## 2025-04-01 ENCOUNTER — APPOINTMENT (OUTPATIENT)
Dept: PHYSICAL THERAPY | Facility: CLINIC | Age: 45
End: 2025-04-01
Payer: COMMERCIAL

## 2025-04-06 NOTE — PROGRESS NOTES
Sports Medicine Office Note    Today's Date:  02/21/2025     HPI: Belinda Tom is a 44 y.o. female who presents today for evaluation of chronic bilateral knee pain.  She states she has had pain in her knees since she was 14 years old, but denies any new injury/trauma.  States pain is primarily over medial aspect of both knees and worse with walking/standing for longer durations and with going from seated to standing position.  She has tried ibuprofen and Tylenol without significant relief of her pain.  States she has tried OTC knee bracing without significant relief.  States she has noticed mild swelling into either knee that is chronic and intermittent, but denies any acute worsening or any new redness, warmth, bruising, radiation of pain, numbness, tingling, or weakness.  Denies any new mechanical locking/catching.  She is following with Dr. Guthrie for management of right lateral epicondylitis and was referred to me to discuss possible viscosupplementation injections.    She has no other complaints.    Physical Examination:     The RIGHT knee has trace to grade 1 joint effusion. Patella crepitus and grind are positive. There is tenderness to the medial and lateral joint lines. Flexion and extension are without mechanical blocking.  Valgus stress testing at 0 and 30 degrees of flexion elicited medial joint line laxity with associated pain that improved with return to neutral position.  Otherwise, there is no instability with stress testing.  The LEFT knee has trace to grade 1 joint effusion. Patella crepitus and grind are positive. There is mild tenderness to the medial and lateral joint lines. Flexion and extension are without mechanical blocking. There is no instability with stress testing.  Skin - no rashes, sores, or open lesions. Strength, sensory and vascular exams are otherwise normal. There is no clubbing, cyanosis or edema.  Gait is slightly antalgic and tandem.     Imaging:  Radiographs of the bilateral  knees obtained today were reviewed and revealed mild tricompartmental DJD, worse in medial compartments bilaterally, otherwise no evidence of acute osseous abnormalities.    The studies were reviewed by me personally in the office today.    === 02/21/25 ===    XR KNEE 4+ VIEWS BILATERAL    - Impression -  Mild degenerative changes medial compartments bilaterally.    MACRO:  None    Signed by: Modesto Guerra 2/22/2025 8:09 AM  Dictation workstation:   [id number]    Problem List Items Addressed This Visit    None  Visit Diagnoses         Codes    Pain in both knees, unspecified chronicity     M25.561, M25.562    Relevant Orders    XR knee 4+ views bilateral (Completed)    Primary osteoarthritis of both knees     M17.0    Relevant Orders    Referral to Physical Therapy    Internal derangement of right knee     M23.91    Relevant Orders    KO Medial OA           Assessment and Plan:    We reviewed the exam and imaging findings and discussed the conservative and surgical treatment options. We agreed to start with conservative management of chronic bilateral knee pain with bilateral knee DJD, worse in medial compartments bilaterally. Physical therapy referral provided and discussed the importance of regular home exercises.  Recommended prescription doses of Tylenol, Voltaren gel, and encouraged use of ice or heat as needed for acute flares of pain.  Prescribed medial  knee brace for right knee medial joint line instability/pain to be used with daily activity and exercise for added comfort and stability.  Discussed with patient that we may consider intra-articular cortisone or viscosupplementation injections in the future if she does not get significant improvement with current interventions, but deferred injections at this time.  Plan for follow-up in 2 months for re-evaluation, otherwise may follow-up sooner if any new concerns arise.  Discussed this plan with the patient who is understanding and  agreeable.    Patient was prescribed a medial  knee brace for right knee DJD with medial joint line instability/pain. The patient is ambulatory with or without aid; but, has weakness, instability and/or deformity of their right knee which requires stabilization from this orthosis to improve their function.      Verbal and written instructions for the use, wear schedule, cleaning and application of this item were given.  Patient was instructed that should the brace result in increased pain, decreased sensation, increased swelling, or an overall worsening of their medical condition, to please contact our office immediately.     Orthotic management and training was provided for skin care, modifications due to healing tissues, edema changes, interruption in skin integrity, and safety precautions with the orthosis.     **This note was dictated using Dragon speech recognition software and was not corrected for spelling or grammatical errors**.    Brain Colvin DO  Primary Care Sports Medicine  [facility] [place]

## 2025-04-29 ENCOUNTER — APPOINTMENT (OUTPATIENT)
Dept: ORTHOPEDIC SURGERY | Facility: HOSPITAL | Age: 45
End: 2025-04-29
Payer: COMMERCIAL

## 2025-05-01 ENCOUNTER — EVALUATION (OUTPATIENT)
Dept: PHYSICAL THERAPY | Facility: CLINIC | Age: 45
End: 2025-05-01
Payer: COMMERCIAL

## 2025-05-01 DIAGNOSIS — M25.561 CHRONIC PAIN OF BOTH KNEES: Primary | ICD-10-CM

## 2025-05-01 DIAGNOSIS — G89.29 CHRONIC PAIN OF BOTH KNEES: Primary | ICD-10-CM

## 2025-05-01 DIAGNOSIS — M17.0 PRIMARY OSTEOARTHRITIS OF BOTH KNEES: ICD-10-CM

## 2025-05-01 DIAGNOSIS — M25.562 CHRONIC PAIN OF BOTH KNEES: Primary | ICD-10-CM

## 2025-05-01 PROCEDURE — 97110 THERAPEUTIC EXERCISES: CPT | Mod: GP | Performed by: PHYSICAL THERAPIST

## 2025-05-01 PROCEDURE — 97161 PT EVAL LOW COMPLEX 20 MIN: CPT | Mod: GP | Performed by: PHYSICAL THERAPIST

## 2025-05-01 ASSESSMENT — ENCOUNTER SYMPTOMS
OCCASIONAL FEELINGS OF UNSTEADINESS: 0
DEPRESSION: 0
LOSS OF SENSATION IN FEET: 0

## 2025-05-01 NOTE — PROGRESS NOTES
Physical Therapy    Physical Therapy Evaluation and Treatment      Patient Name: Belinda Tom  MRN: [id number]  Today's Date: 5/1/2025  Time Entry:   Time Calculation  Start Time: 1520  Stop Time: 1600  Time Calculation (min): 40 min  PT Evaluation Time Entry  PT Evaluation (Low) Time Entry: 25  PT Therapeutic Procedures Time Entry  Therapeutic Exercise Time Entry: 15  Insurance: MMO [employer], 40 PT/OT, no auth  Visit #1    Assessment:  Belinda is a 44 y.o. female presenting with chronic B knee pain R>L ongoing for 30 years with progressive worsening within the last few years. Exam shows b genu varus, decreased knee ROM, decreased lower extremity strength, decreased lower extremity flexibility and pain with functional movement patterns. She is limited in stair descent, functional squatting and general activity (swimming, tennis, elliptical). Pt would benefit from skilled PT to address the listed impairments and decrease knee pain to improve ADLs.    Plan:  OP PT Plan  Treatment/Interventions: Therapeutic exercises, Manual therapy, Dry needling, Electrical stimulation, Cryotherapy, Hot pack, Education/ Instruction, Taping techniques  PT Plan: Skilled PT  PT Frequency:  (Every other week)  Duration: 6-8 visits  Onset Date: 02/21/25  Number of Treatments Authorized: 40 PT/OT  Rehab Potential: Good  Plan of Care Agreement: Patient      Problem List Items Addressed This Visit           ICD-10-CM    Chronic pain of both knees - Primary M25.561, M25.562, G89.29    Relevant Orders    Follow Up In Physical Therapy     Other Visit Diagnoses         Codes      Primary osteoarthritis of both knees     M17.0    Relevant Orders    Follow Up In Physical Therapy            General:  Reason for Referral: B knee pain  Referred By: Brain Colvin DO  Preferred Learning Style: verbal, visual, written    Subjective    Belinda c/o chronic B knee pain R>L since she was 14 y.o. which she attributes to playing sports around that time. She  noticed progressive worsening of pain over the last few years. She notices pain most with going down stairs and squatting and states pain is typically worst by the end of the day. She is taking Advil before bed and during the day if pain is bad. She states knees make noise constantly but denies any locking or giving out. She saw ortho and had x-rays which showed “joint space narrowing with osteophytosis in the medial compartments bilaterally. There is no fracture. There is no dislocation. No effusion seen.” She was prescribed a brace but states she needs it adjusted secondary to discomfort. She denies any pertinent medical hx.    Pt Goals: “I would like the pain to lessen.”    Precautions:  Precautions  STEADI Fall Risk Score (The score of 4 or more indicates an increased risk of falling): 0    Pain:  B anteromedial knee 2-3/10 currently, 4-6/10 at worst, constant in nature    Prior Level of Function:  Independent in all activities. Works as a teacher. Active individual- swims laps, plays tennis, uses elliptical    Objective   Palpation: TTP over medial joint lines    Observation: B genu varus, B pes cavus    Gait: equal stance time/step length, non-antalgic    Squat- painful, maintains equal LE weight distribution    Single leg balance: 30 seconds bilat    Hip AROM: hypermobile ER and hypomobile IR    Knee AROM (R/L in degrees): 3-135; 5-135    MMT (R/L):   Iliopsoas- 4+/5; 4+/5  Hip ER- 4+/5; 4+/5  Hip Abd- 4+/5; 4+/5  Glute max- 4+/5; 4+/5  Quadriceps- 5/5; 5/5  Hamstrings- 4+/5; 4+/5    Length tests (R/L in degrees):  90/90 hamstrings- (12) / (20)  Gastrocs- 7 / 5    Special tests:   (-) Valgus stress  (-) Varus stress  (-) Lachman    Meniscus CPR:  (-) History of catching or locking  (+) Joint line tenderness  (-) Pain with forced hyperextension (modified bounce home test)  (-) Pain with maximal passive knee flexion  (-) Pain or audible click with Qiana's maneuver    Outcome Measures:  LEFS:  50/80    Treatments:  Therapeutic Exercise:   SLR x 10 ea leg  LSLR x 10 ea leg  Bridges x 10  TKE, green band, x 10 ea leg  Standing calf stretch 2x30 sec ea leg    EDUCATION:  Access Code: [email or web address]  URL: [email or web address]  Date: 05/01/2025  Prepared by: Saroj Lima    Exercises  - Active Straight Leg Raise with Quad Set  - 1 x daily - 7 x weekly - 2-3 sets - 10 reps  - Sidelying Hip Abduction  - 1 x daily - 7 x weekly - 2-3 sets - 10 reps  - Bridge  - 1 x daily - 7 x weekly - 2-3 sets - 10 reps  - Standing Terminal Knee Extension with Resistance  - 1 x daily - 7 x weekly - 2-3 sets - 10 reps - 5 seconds hold  - Standing Gastroc Stretch  - 1 x daily - 7 x weekly - 3-5 reps - 30 seconds hold    Goals:  Active       PT Problem       PT Goal 1       Start:  05/02/25    Expected End:  07/30/25       Increase B knee AROM to 0 degrees extension.         PT Goal 2       Start:  05/02/25    Expected End:  07/30/25       Increase B hip and knee MMT by at least 1/3 grade in all weak muscles to reduce stress on knee joints during stair negotiation and functional squatting.         PT Goal 3       Start:  05/02/25    Expected End:  07/30/25       Increase length in B gastrocs to 10 degrees or more to reduce posterior forces on the knees during stair descent and functional squatting.         PT Goal 4       Start:  05/02/25    Expected End:  07/30/25       Pt will perform double leg squat maintaining neutral femoral alignment without knee pain for carryover to chair/toilet/car transfers and bending to  objects.          PT Goal 5       Start:  05/02/25    Expected End:  07/30/25       Pt will report at least 75% decrease in B knee pain to improve stair negotiation, squatting and physical activity including swimming, playing tennis and working out on [employer].         PT Goal 6       Start:  05/02/25    Expected End:  07/30/25       Improve LEFS by 9 points (MDIC).

## 2025-05-22 ENCOUNTER — APPOINTMENT (OUTPATIENT)
Dept: PHYSICAL THERAPY | Facility: CLINIC | Age: 45
End: 2025-05-22
Payer: COMMERCIAL

## 2025-05-22 DIAGNOSIS — M25.562 CHRONIC PAIN OF BOTH KNEES: Primary | ICD-10-CM

## 2025-05-22 DIAGNOSIS — M17.0 PRIMARY OSTEOARTHRITIS OF BOTH KNEES: ICD-10-CM

## 2025-05-22 DIAGNOSIS — M25.561 CHRONIC PAIN OF BOTH KNEES: Primary | ICD-10-CM

## 2025-05-22 DIAGNOSIS — G89.29 CHRONIC PAIN OF BOTH KNEES: Primary | ICD-10-CM

## 2025-05-22 PROCEDURE — 97110 THERAPEUTIC EXERCISES: CPT | Mod: GP | Performed by: PHYSICAL THERAPIST

## 2025-05-22 NOTE — PROGRESS NOTES
"Physical Therapy    Physical Therapy Treatment    Patient Name: Belinda Tom  MRN: [id number]  Today's Date: 5/22/2025    Time Entry:   Time Calculation  Start Time: 1630  Stop Time: 1715  Time Calculation (min): 45 min     PT Therapeutic Procedures Time Entry  Therapeutic Exercise Time Entry: 40    Insurance: MMO [employer], 40 PT/OT, no auth  Visit #2    Assessment:  Pt progressed to marching bridges with mild pelvic drop. Some anterior knee pain reproduced with leg press at 90 degrees flexion and was alleviated with placing feet on high plate to decrease knee flexion angle.    Plan:  Monster walks, wall squats, step ups.    Problem List Items Addressed This Visit           ICD-10-CM    Chronic pain of both knees - Primary M25.561, M25.562, G89.29     Other Visit Diagnoses         Codes      Primary osteoarthritis of both knees     M17.0            Subjective    \"Knees feel better than this morning. My L knee felt like it was stuck this morning. 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 I've been doing the pretty much every day.\"    Precautions  STEADI Fall Risk Score (The score of 4 or more indicates an increased risk of falling): 0    Pain  B knees 1/10    Objective   Palpation: TTP over medial joint lines    Treatments:  Therapeutic Exercise:   SRC seat 11 x 5 mins  Supine psoas march, yellow band, 2x10 ea leg  Marching bridges 2x10 ea leg  Clamshells x 20 ea leg  TKE 3P x 20 ea leg  Leg press (feet on high plate) 60# 3x10  Side stepping, blue band at calf, 2x25 ft ea way  Standing calf stretch 3x30 sec ea leg    OP EDUCATION:  HEP: SLR, LSLR, bridge, TKE, calf stretch     Access Code: [email or web address]  URL: [email or web address]  Date: 05/22/2025  Prepared by: Saroj Lima    Exercises  - Supine Hip Flexion with Resistance Loop  - 1 x daily - 7 x weekly - 2-3 sets - 10 reps  - Marching Bridge  - 1 x daily - 7 x weekly - 2-3 sets - 10 reps    Goals:  Active       PT Problem       PT " Goal 1       Start:  05/02/25    Expected End:  07/30/25       Increase B knee AROM to 0 degrees extension.         PT Goal 2       Start:  05/02/25    Expected End:  07/30/25       Increase B hip and knee MMT by at least 1/3 grade in all weak muscles to reduce stress on knee joints during stair negotiation and functional squatting.         PT Goal 3       Start:  05/02/25    Expected End:  07/30/25       Increase length in B gastrocs to 10 degrees or more to reduce posterior forces on the knees during stair descent and functional squatting.         PT Goal 4       Start:  05/02/25    Expected End:  07/30/25       Pt will perform double leg squat maintaining neutral femoral alignment without knee pain for carryover to chair/toilet/car transfers and bending to  objects.          PT Goal 5       Start:  05/02/25    Expected End:  07/30/25       Pt will report at least 75% decrease in B knee pain to improve stair negotiation, squatting and physical activity including swimming, playing tennis and working out on [employer].         PT Goal 6       Start:  05/02/25    Expected End:  07/30/25       Improve LEFS by 9 points (MDIC).

## 2025-06-05 ENCOUNTER — APPOINTMENT (OUTPATIENT)
Dept: PHYSICAL THERAPY | Facility: CLINIC | Age: 45
End: 2025-06-05
Payer: COMMERCIAL

## 2025-06-19 ENCOUNTER — APPOINTMENT (OUTPATIENT)
Dept: PHYSICAL THERAPY | Facility: CLINIC | Age: 45
End: 2025-06-19
Payer: COMMERCIAL

## 2025-06-19 DIAGNOSIS — M17.0 PRIMARY OSTEOARTHRITIS OF BOTH KNEES: ICD-10-CM

## 2025-06-19 DIAGNOSIS — M25.561 CHRONIC PAIN OF BOTH KNEES: ICD-10-CM

## 2025-06-19 DIAGNOSIS — M25.562 CHRONIC PAIN OF BOTH KNEES: ICD-10-CM

## 2025-06-19 DIAGNOSIS — G89.29 CHRONIC PAIN OF BOTH KNEES: ICD-10-CM

## 2025-06-19 PROCEDURE — 97110 THERAPEUTIC EXERCISES: CPT | Mod: GP,CQ

## 2025-06-19 NOTE — PROGRESS NOTES
"Physical Therapy    Physical Therapy Treatment    Patient Name: Belinda Tom  MRN: [id number]  Today's Date: 6/19/2025    Time Entry:   Time Calculation  Start Time: 0253  Stop Time: 0327  Time Calculation (min): 34 min     PT Therapeutic Procedures Time Entry  Therapeutic Exercise Time Entry: 34    Insurance: MMO Med[employer], 40 PT/OT, no auth  Visit #3    Assessment:  Pt reports decreased sx's overall, but was not able to bend R knee w/o pain    Plan:  Resume LP, cable column amb  wall squats, step ups.    Problem List Items Addressed This Visit           ICD-10-CM    Chronic pain of both knees M25.561, M25.562, G89.29     Other Visit Diagnoses         Codes      Primary osteoarthritis of both knees     M17.0            Subjective    \"I helped my  paint the house, so I was up/down on ladders.  It's a little better now.\"    Precautions  STEADI Fall Risk Score (The score of 4 or more indicates an increased risk of falling): 0    Pain  L knee 1/10, R knee 3/10    Objective   Palpation: TTP over medial joint lines    Treatments:  Therapeutic Exercise:   Nu Step x 4 min, R knee pain  Supine psoas march, yellow band, x10 L  Marching x10 ea leg  Hooklying clamshells x 20, green TB  LAQ 2x10 ea  TKE black band x 20 ea leg  Leg press (feet on high plate) 60# 3x10, not today  Side stepping, blue band at calf, x 3, long rail  Monster walks x 3, RTB  Standing calf stretch 3x30 sec ea leg    OP EDUCATION:  HEP: SLR, LSLR, bridge, TKE, calf stretch     Access Code: [email or web address]  URL: [email or web address]  Date: 05/22/2025  Prepared by: Saroj Lima    Exercises  - Supine Hip Flexion with Resistance Loop  - 1 x daily - 7 x weekly - 2-3 sets - 10 reps  - Marching Bridge  - 1 x daily - 7 x weekly - 2-3 sets - 10 reps    Goals:  Active       PT Problem       PT Goal 1       Start:  05/02/25    Expected End:  07/30/25       Increase B knee AROM to 0 degrees extension.         PT Goal 2       Start:  " 05/02/25    Expected End:  07/30/25       Increase B hip and knee MMT by at least 1/3 grade in all weak muscles to reduce stress on knee joints during stair negotiation and functional squatting.         PT Goal 3       Start:  05/02/25    Expected End:  07/30/25       Increase length in B gastrocs to 10 degrees or more to reduce posterior forces on the knees during stair descent and functional squatting.         PT Goal 4       Start:  05/02/25    Expected End:  07/30/25       Pt will perform double leg squat maintaining neutral femoral alignment without knee pain for carryover to chair/toilet/car transfers and bending to  objects.          PT Goal 5       Start:  05/02/25    Expected End:  07/30/25       Pt will report at least 75% decrease in B knee pain to improve stair negotiation, squatting and physical activity including swimming, playing tennis and working out on elliptical.         PT Goal 6       Start:  05/02/25    Expected End:  07/30/25       Improve LEFS by 9 points (MDIC).

## 2025-07-03 ENCOUNTER — TREATMENT (OUTPATIENT)
Dept: PHYSICAL THERAPY | Facility: CLINIC | Age: 45
End: 2025-07-03
Payer: COMMERCIAL

## 2025-07-03 DIAGNOSIS — M25.561 CHRONIC PAIN OF BOTH KNEES: ICD-10-CM

## 2025-07-03 DIAGNOSIS — G89.29 CHRONIC PAIN OF BOTH KNEES: ICD-10-CM

## 2025-07-03 DIAGNOSIS — M17.0 PRIMARY OSTEOARTHRITIS OF BOTH KNEES: ICD-10-CM

## 2025-07-03 DIAGNOSIS — M25.562 CHRONIC PAIN OF BOTH KNEES: ICD-10-CM

## 2025-07-03 PROCEDURE — 97110 THERAPEUTIC EXERCISES: CPT | Mod: GP | Performed by: PHYSICAL THERAPIST

## 2025-07-03 NOTE — PROGRESS NOTES
"Physical Therapy    Physical Therapy Treatment    Patient Name: Belinda Tom  MRN: [id number]  Today's Date: 7/3/2025    Time Entry:   Time Calculation  Start Time: 1045  Stop Time: 1132  Time Calculation (min): 47 min     PT Therapeutic Procedures Time Entry  Therapeutic Exercise Time Entry: 45    Insurance: [employer], 40 PT/OT, no auth  Visit #4    Assessment:  Pt has resumed her full exercise program including new exercises. She had c/o pain during wall squats, so they were discontinued. Pt had no other reported issues with her exercise program otherwise.     Plan:  Resume marching bridges  Discontinue wall squats  Start cable column side steps  Resume side lying clamshells  Progress HEP    Problem List Items Addressed This Visit           ICD-10-CM    Chronic pain of both knees M25.561, M25.562, G89.29     Other Visit Diagnoses         Codes      Primary osteoarthritis of both knees     M17.0            Subjective    \"I still have to paint my house, so I will be in some pain... The pain isn't bad right now.\"    Precautions  STEADI Fall Risk Score (The score of 4 or more indicates an increased risk of falling): 0    Pain  L knee 1/10, R knee 1.5/10    Objective   Forward head posture observed    Treatments:  Therapeutic Exercise:   Nu Step x 4 min  Supine psoas march, yellow band, x10 ea side  Marching x10 ea leg   Hooklying clamshells x 20, green TB   TKE black band x 20 ea leg  Leg press (feet on high plate) 60# 3x10  Side stepping, blue band at calf, x 3, long rail  Monster walks x 3, RTB  Wall squats - discontinued due to pain  Step ups w/ march - 30 ea leg  Standing HSC 2# 2 x 10 ea leg  Cable column forward walk x 10  Cable column backward walk x 5  Standing calf stretch 3x30 sec ea leg  Supine hamstring stretch 1 x 30 sec ea leg      OP EDUCATION:  HEP: SLR, LSLR, bridge, TKE, calf stretch     Access Code: [email or web address]  URL: [email or web address]  Date: 05/22/2025  Prepared by: Saroj" Campolieti    Exercises  - Supine Hip Flexion with Resistance Loop  - 1 x daily - 7 x weekly - 2-3 sets - 10 reps  - Marching Bridge  - 1 x daily - 7 x weekly - 2-3 sets - 10 reps    Goals:  Active       PT Problem       PT Goal 1       Start:  05/02/25    Expected End:  07/30/25       Increase B knee AROM to 0 degrees extension.         PT Goal 2       Start:  05/02/25    Expected End:  07/30/25       Increase B hip and knee MMT by at least 1/3 grade in all weak muscles to reduce stress on knee joints during stair negotiation and functional squatting.         PT Goal 3       Start:  05/02/25    Expected End:  07/30/25       Increase length in B gastrocs to 10 degrees or more to reduce posterior forces on the knees during stair descent and functional squatting.         PT Goal 4       Start:  05/02/25    Expected End:  07/30/25       Pt will perform double leg squat maintaining neutral femoral alignment without knee pain for carryover to chair/toilet/car transfers and bending to  objects.          PT Goal 5       Start:  05/02/25    Expected End:  07/30/25       Pt will report at least 75% decrease in B knee pain to improve stair negotiation, squatting and physical activity including swimming, playing tennis and working out on [email or web address].         PT Goal 6       Start:  05/02/25    Expected End:  07/30/25       Improve LEFS by 9 points (MDIC).

## 2025-07-17 ENCOUNTER — APPOINTMENT (OUTPATIENT)
Dept: PHYSICAL THERAPY | Facility: CLINIC | Age: 45
End: 2025-07-17
Payer: COMMERCIAL

## 2025-07-31 ENCOUNTER — APPOINTMENT (OUTPATIENT)
Dept: PHYSICAL THERAPY | Facility: CLINIC | Age: 45
End: 2025-07-31
Payer: COMMERCIAL

## 2025-08-14 ENCOUNTER — TREATMENT (OUTPATIENT)
Dept: PHYSICAL THERAPY | Facility: CLINIC | Age: 45
End: 2025-08-14
Payer: COMMERCIAL

## 2025-08-14 DIAGNOSIS — G89.29 CHRONIC PAIN OF BOTH KNEES: ICD-10-CM

## 2025-08-14 DIAGNOSIS — M17.0 PRIMARY OSTEOARTHRITIS OF BOTH KNEES: ICD-10-CM

## 2025-08-14 DIAGNOSIS — M25.561 CHRONIC PAIN OF BOTH KNEES: ICD-10-CM

## 2025-08-14 DIAGNOSIS — M25.562 CHRONIC PAIN OF BOTH KNEES: ICD-10-CM

## 2025-08-14 PROCEDURE — 97110 THERAPEUTIC EXERCISES: CPT | Mod: GP | Performed by: PHYSICAL THERAPIST

## 2025-08-18 ENCOUNTER — APPOINTMENT (OUTPATIENT)
Dept: PHYSICAL THERAPY | Facility: CLINIC | Age: 45
End: 2025-08-18
Payer: COMMERCIAL

## 2025-08-18 DIAGNOSIS — M17.0 PRIMARY OSTEOARTHRITIS OF BOTH KNEES: ICD-10-CM

## 2025-08-18 DIAGNOSIS — M25.561 CHRONIC PAIN OF BOTH KNEES: ICD-10-CM

## 2025-08-18 DIAGNOSIS — G89.29 CHRONIC PAIN OF BOTH KNEES: ICD-10-CM

## 2025-08-18 DIAGNOSIS — M25.562 CHRONIC PAIN OF BOTH KNEES: ICD-10-CM

## 2025-08-18 PROCEDURE — 97110 THERAPEUTIC EXERCISES: CPT | Mod: GP | Performed by: PHYSICAL THERAPIST

## 2025-08-28 ENCOUNTER — APPOINTMENT (OUTPATIENT)
Dept: PHYSICAL THERAPY | Facility: CLINIC | Age: 45
End: 2025-08-28
Payer: COMMERCIAL

## 2025-08-28 DIAGNOSIS — M25.561 CHRONIC PAIN OF BOTH KNEES: ICD-10-CM

## 2025-08-28 DIAGNOSIS — M17.0 PRIMARY OSTEOARTHRITIS OF BOTH KNEES: ICD-10-CM

## 2025-08-28 DIAGNOSIS — M25.562 CHRONIC PAIN OF BOTH KNEES: ICD-10-CM

## 2025-08-28 DIAGNOSIS — G89.29 CHRONIC PAIN OF BOTH KNEES: ICD-10-CM

## 2025-08-28 PROCEDURE — 97110 THERAPEUTIC EXERCISES: CPT | Mod: GP | Performed by: PHYSICAL THERAPIST

## 2025-09-04 ENCOUNTER — APPOINTMENT (OUTPATIENT)
Dept: PHYSICAL THERAPY | Facility: CLINIC | Age: 45
End: 2025-09-04
Payer: COMMERCIAL

## 2025-11-21 ENCOUNTER — APPOINTMENT (OUTPATIENT)
Dept: OBSTETRICS AND GYNECOLOGY | Facility: CLINIC | Age: 45
End: 2025-11-21
Payer: COMMERCIAL